# Patient Record
Sex: FEMALE | Race: BLACK OR AFRICAN AMERICAN | NOT HISPANIC OR LATINO | Employment: FULL TIME | URBAN - METROPOLITAN AREA
[De-identification: names, ages, dates, MRNs, and addresses within clinical notes are randomized per-mention and may not be internally consistent; named-entity substitution may affect disease eponyms.]

---

## 2019-04-25 ENCOUNTER — OFFICE VISIT (OUTPATIENT)
Dept: URGENT CARE | Facility: CLINIC | Age: 24
End: 2019-04-25
Payer: COMMERCIAL

## 2019-04-25 VITALS
SYSTOLIC BLOOD PRESSURE: 124 MMHG | BODY MASS INDEX: 27.31 KG/M2 | RESPIRATION RATE: 18 BRPM | OXYGEN SATURATION: 95 % | TEMPERATURE: 98 F | DIASTOLIC BLOOD PRESSURE: 92 MMHG | WEIGHT: 160 LBS | HEART RATE: 76 BPM | HEIGHT: 64 IN

## 2019-04-25 DIAGNOSIS — R30.0 DYSURIA: Primary | ICD-10-CM

## 2019-04-25 LAB
BILIRUB UR QL STRIP: NEGATIVE
GLUCOSE UR QL STRIP: NEGATIVE
KETONES UR QL STRIP: NEGATIVE
LEUKOCYTE ESTERASE UR QL STRIP: POSITIVE
PH, POC UA: 6
POC BLOOD, URINE: POSITIVE
POC NITRATES, URINE: NEGATIVE
PROT UR QL STRIP: POSITIVE
SP GR UR STRIP: 1.02 (ref 1–1.03)
UROBILINOGEN UR STRIP-ACNC: NORMAL (ref 0.1–1.1)

## 2019-04-25 PROCEDURE — 3008F PR BODY MASS INDEX (BMI) DOCUMENTED: ICD-10-PCS | Mod: CPTII,S$GLB,, | Performed by: FAMILY MEDICINE

## 2019-04-25 PROCEDURE — 99203 PR OFFICE/OUTPT VISIT, NEW, LEVL III, 30-44 MIN: ICD-10-PCS | Mod: 25,S$GLB,, | Performed by: FAMILY MEDICINE

## 2019-04-25 PROCEDURE — 3008F BODY MASS INDEX DOCD: CPT | Mod: CPTII,S$GLB,, | Performed by: FAMILY MEDICINE

## 2019-04-25 PROCEDURE — 81003 POCT URINALYSIS, DIPSTICK, AUTOMATED, W/O SCOPE: ICD-10-PCS | Mod: QW,S$GLB,, | Performed by: FAMILY MEDICINE

## 2019-04-25 PROCEDURE — 99203 OFFICE O/P NEW LOW 30 MIN: CPT | Mod: 25,S$GLB,, | Performed by: FAMILY MEDICINE

## 2019-04-25 PROCEDURE — 81003 URINALYSIS AUTO W/O SCOPE: CPT | Mod: QW,S$GLB,, | Performed by: FAMILY MEDICINE

## 2019-04-25 RX ORDER — FLUCONAZOLE 150 MG/1
150 TABLET ORAL DAILY
Qty: 3 TABLET | Refills: 1 | Status: SHIPPED | OUTPATIENT
Start: 2019-04-25 | End: 2019-04-26

## 2019-04-25 RX ORDER — PHENAZOPYRIDINE HYDROCHLORIDE 200 MG/1
200 TABLET, FILM COATED ORAL 3 TIMES DAILY PRN
Qty: 6 TABLET | Refills: 2 | Status: SHIPPED | OUTPATIENT
Start: 2019-04-25 | End: 2019-04-27

## 2019-04-25 RX ORDER — SULFAMETHOXAZOLE AND TRIMETHOPRIM 800; 160 MG/1; MG/1
1 TABLET ORAL 2 TIMES DAILY
Qty: 10 TABLET | Refills: 1 | Status: SHIPPED | OUTPATIENT
Start: 2019-04-25 | End: 2019-11-08 | Stop reason: SDUPTHER

## 2019-04-25 NOTE — PROGRESS NOTES
"Subjective:       Patient ID: Marianne Hester is a 23 y.o. female.    Vitals:  height is 5' 4" (1.626 m) and weight is 72.6 kg (160 lb). Her oral temperature is 98.1 °F (36.7 °C). Her blood pressure is 124/92 (abnormal) and her pulse is 76. Her respiration is 18 and oxygen saturation is 95%.     Chief Complaint: Urinary Tract Infection    Pt states when she goes to the bathroom doesn't feeling like burning or pain, it just discomfort and feels like "tingling" since Saturday. Pt states she is going more often to the bathroom but amount of urine is less. Pt states she had UTI once and the medicine given to treat it cause her yeast infection.     Urinary Tract Infection    This is a new problem. The current episode started in the past 7 days. The problem occurs every urination. The problem has been unchanged. The pain is at a severity of 6/10. The pain is moderate. There has been no fever. She is sexually active. There is a history of pyelonephritis. Associated symptoms include frequency. Pertinent negatives include no chills, hematuria, nausea, urgency, vomiting or rash. Treatments tried: azo. The treatment provided mild relief. Her past medical history is significant for recurrent UTIs and STD.       Constitution: Negative for chills and fever.   Neck: Negative for painful lymph nodes.   Gastrointestinal: Negative for abdominal pain, nausea and vomiting.   Genitourinary: Positive for frequency, urine decreased and irregular menstruation. Negative for dysuria, urgency, hematuria, history of kidney stones, painful menstruation, missed menses, heavy menstrual bleeding, ovarian cysts, genital trauma, vaginal pain, vaginal discharge, vaginal bleeding, vaginal odor, painful intercourse, genital sore, painful ejaculation and pelvic pain.   Musculoskeletal: Negative for back pain.   Skin: Negative for rash and lesion.   Hematologic/Lymphatic: Negative for swollen lymph nodes.       Objective:      Physical Exam "   Constitutional: She is oriented to person, place, and time. She appears well-developed and well-nourished.   HENT:   Head: Atraumatic.   Nose: Nose normal.   Eyes: Lids are normal.   Neck: Trachea normal, normal range of motion and phonation normal. Neck supple.   Cardiovascular: Normal rate and normal pulses.   Pulmonary/Chest: Effort normal.   Abdominal: Soft. Normal appearance and bowel sounds are normal. She exhibits no distension. There is tenderness in the suprapubic area. There is no CVA tenderness.   Neurological: She is alert and oriented to person, place, and time.   Skin: Skin is warm, dry and intact.   Psychiatric: She has a normal mood and affect. Her speech is normal and behavior is normal. Cognition and memory are normal.   Nursing note and vitals reviewed.      Assessment:       1. Dysuria        Plan:         Dysuria  -     POCT Urinalysis, Dipstick, Automated, W/O Scope  -     Urine culture    Other orders  -     sulfamethoxazole-trimethoprim 800-160mg (BACTRIM DS) 800-160 mg Tab; Take 1 tablet by mouth 2 (two) times daily.  Dispense: 10 tablet; Refill: 1  -     fluconazole (DIFLUCAN) 150 MG Tab; Take 1 tablet (150 mg total) by mouth once daily. for 1 day  Dispense: 3 tablet; Refill: 1  -     phenazopyridine (PYRIDIUM) 200 MG tablet; Take 1 tablet (200 mg total) by mouth 3 (three) times daily as needed for Pain.  Dispense: 6 tablet; Refill: 2

## 2019-04-30 ENCOUNTER — TELEPHONE (OUTPATIENT)
Dept: URGENT CARE | Facility: CLINIC | Age: 24
End: 2019-04-30

## 2019-04-30 LAB
BACTERIA UR CULT: ABNORMAL
BACTERIA UR CULT: ABNORMAL

## 2019-04-30 NOTE — TELEPHONE ENCOUNTER
Per Dr. Casey, patient's urine culture is +staph, and patient is to continue bactrim.   I contacted the patient to notify her of this.  I informed her that if she is not feeling better after completing the bactrim, she can follow up with her PCP or come back in to see us if necessary.  She verbalized understanding.

## 2019-12-23 ENCOUNTER — OFFICE VISIT (OUTPATIENT)
Dept: URGENT CARE | Facility: CLINIC | Age: 24
End: 2019-12-23
Payer: COMMERCIAL

## 2019-12-23 VITALS
TEMPERATURE: 98 F | SYSTOLIC BLOOD PRESSURE: 130 MMHG | BODY MASS INDEX: 30.22 KG/M2 | HEIGHT: 64 IN | WEIGHT: 177 LBS | OXYGEN SATURATION: 98 % | DIASTOLIC BLOOD PRESSURE: 93 MMHG | RESPIRATION RATE: 16 BRPM | HEART RATE: 86 BPM

## 2019-12-23 DIAGNOSIS — J02.9 SORE THROAT: ICD-10-CM

## 2019-12-23 DIAGNOSIS — R51.9 GENERALIZED HEADACHES: ICD-10-CM

## 2019-12-23 DIAGNOSIS — J06.9 VIRAL URI WITH COUGH: Primary | ICD-10-CM

## 2019-12-23 LAB
CTP QC/QA: YES
CTP QC/QA: YES
FLUAV AG NPH QL: NEGATIVE
FLUBV AG NPH QL: NEGATIVE
S PYO RRNA THROAT QL PROBE: NEGATIVE

## 2019-12-23 PROCEDURE — 87804 POCT INFLUENZA A/B: ICD-10-PCS | Mod: 59,QW,S$GLB, | Performed by: PHYSICIAN ASSISTANT

## 2019-12-23 PROCEDURE — 99214 OFFICE O/P EST MOD 30 MIN: CPT | Mod: S$GLB,,, | Performed by: PHYSICIAN ASSISTANT

## 2019-12-23 PROCEDURE — 87880 STREP A ASSAY W/OPTIC: CPT | Mod: QW,S$GLB,, | Performed by: PHYSICIAN ASSISTANT

## 2019-12-23 PROCEDURE — 87880 POCT RAPID STREP A: ICD-10-PCS | Mod: QW,S$GLB,, | Performed by: PHYSICIAN ASSISTANT

## 2019-12-23 PROCEDURE — 99214 PR OFFICE/OUTPT VISIT, EST, LEVL IV, 30-39 MIN: ICD-10-PCS | Mod: S$GLB,,, | Performed by: PHYSICIAN ASSISTANT

## 2019-12-23 PROCEDURE — 87804 INFLUENZA ASSAY W/OPTIC: CPT | Mod: QW,S$GLB,, | Performed by: PHYSICIAN ASSISTANT

## 2019-12-23 RX ORDER — BENZONATATE 100 MG/1
100 CAPSULE ORAL EVERY 6 HOURS PRN
Qty: 60 CAPSULE | Refills: 1 | Status: SHIPPED | OUTPATIENT
Start: 2019-12-23 | End: 2020-12-22

## 2019-12-23 RX ORDER — PREDNISONE 10 MG/1
TABLET ORAL
Qty: 20 TABLET | Refills: 0 | Status: SHIPPED | OUTPATIENT
Start: 2019-12-23 | End: 2021-02-03 | Stop reason: ALTCHOICE

## 2019-12-24 NOTE — PROGRESS NOTES
"Subjective:       Patient ID: Marianne Hester is a 23 y.o. female.    Vitals:  height is 5' 4" (1.626 m) and weight is 80.3 kg (177 lb). Her oral temperature is 98.3 °F (36.8 °C). Her blood pressure is 130/93 (abnormal) and her pulse is 86. Her respiration is 16 and oxygen saturation is 98%.     Chief Complaint: Sinus Problem    Patient has been having a runny nose, scratchy throat, headaches, light headed, and having hot flashes as well as a cough since yesterday. Patient has taken astelin and flonase.    Sinus Problem   This is a new problem. The current episode started yesterday. The problem has been gradually worsening since onset. There has been no fever. Her pain is at a severity of 0/10. She is experiencing no pain. Associated symptoms include coughing, diaphoresis, headaches, sneezing and a sore throat (scratchy). Pertinent negatives include no chills, congestion, ear pain, shortness of breath or sinus pressure. Past treatments include saline sprays. The treatment provided mild relief.       Constitution: Positive for sweating. Negative for chills, fatigue and fever.   HENT: Positive for sore throat (scratchy). Negative for ear pain, congestion, sinus pain, sinus pressure and voice change.    Neck: Negative for painful lymph nodes.   Eyes: Negative for eye redness.   Respiratory: Positive for cough. Negative for chest tightness, sputum production, bloody sputum, COPD, shortness of breath, stridor, wheezing and asthma.    Gastrointestinal: Negative for nausea and vomiting.   Musculoskeletal: Negative for muscle ache.   Skin: Negative for rash.   Allergic/Immunologic: Positive for sneezing. Negative for seasonal allergies and asthma.   Neurological: Positive for headaches.   Hematologic/Lymphatic: Negative for swollen lymph nodes.       Objective:      Physical Exam   Constitutional: She is oriented to person, place, and time. She appears well-developed and well-nourished. She is cooperative.  Non-toxic " appearance. She does not have a sickly appearance. She does not appear ill. No distress.   HENT:   Head: Normocephalic and atraumatic.   Right Ear: Hearing, external ear and ear canal normal. A middle ear effusion (clear) is present.   Left Ear: Hearing, external ear and ear canal normal. A middle ear effusion (clear) is present.   Nose: Rhinorrhea present. No mucosal edema or nasal deformity. No epistaxis. Right sinus exhibits no maxillary sinus tenderness and no frontal sinus tenderness. Left sinus exhibits no maxillary sinus tenderness and no frontal sinus tenderness.   Mouth/Throat: Uvula is midline and mucous membranes are normal. No trismus in the jaw. Normal dentition. No uvula swelling. No oropharyngeal exudate or posterior oropharyngeal edema.   Eyes: Conjunctivae and lids are normal. No scleral icterus.   Neck: Trachea normal, full passive range of motion without pain and phonation normal. Neck supple. No neck rigidity. No edema and no erythema present.   Cardiovascular: Normal rate, regular rhythm, normal heart sounds, intact distal pulses and normal pulses.   Pulmonary/Chest: Effort normal and breath sounds normal. No respiratory distress. She has no decreased breath sounds. She has no rhonchi.   Abdominal: Normal appearance.   Musculoskeletal: Normal range of motion. She exhibits no edema or deformity.   Neurological: She is alert and oriented to person, place, and time. She exhibits normal muscle tone. Coordination normal.   Skin: Skin is warm, dry, intact, not diaphoretic and not pale.   Psychiatric: She has a normal mood and affect. Her speech is normal and behavior is normal. Judgment and thought content normal. Cognition and memory are normal.   Nursing note and vitals reviewed.        Assessment:       1. Viral URI with cough    2. Generalized headaches    3. Sore throat        Plan:         Viral URI with cough    Generalized headaches  -     POCT Influenza A/B (negative)    Sore throat  -      POCT rapid strep A (negative)    Other orders  -     predniSONE (DELTASONE) 10 MG tablet; Take 40mg for 2days, take 30mg for 2 days, take 20mg for 2 days, take 10mg for 2 days  Dispense: 20 tablet; Refill: 0  -     benzonatate (TESSALON PERLES) 100 MG capsule; Take 1 capsule (100 mg total) by mouth every 6 (six) hours as needed.  Dispense: 60 capsule; Refill: 1    Discussed risks vs benefits steroids and patient would like to proceed.  Discussed symptomatic treatment and warning signs as listed below    Symptomatic treatment:    Alternate Tylenol and Ibuprofen every 3 hrs  salt water gargles to soothe throat  Honey/lemon water to soothe throat  Cold-eeze helps to reduce the duration of URI symptoms  Elderberry to reduce duration of URI symptoms  Cepachol helps to numb the discomfort in throat  Nasal saline spray reduces inflammation and dryness  Warm face compresses/hot showers as often as you can to open sinuses   Vicks vapor rub at night  Flonase OTC or Nasacort OTC  Simple foods like chicken noodle soup help hydrate  Delsym helps with coughing at night  Zyrtec/Claritin during the day and Benadryl at night may help if allergy component   Zantac will help if there is reflux from the post nasal drip  Rest as much as you can  Your symptoms will likely last 7-10 days, maybe longer depending on how it affects your body.  You are contagious 7-10, so minimize contact with others to reduce the spread to others and stay home from work or school as we discussed. Dehydration is preventable but is one of the main reasons why you will feel so badly. Drink pedialyte, gatorade or propel. Stay hydrated.  Antibiotics are not needed unless a complication(such as Otitis Media, Bacterial sinus infection or pneumonia). Taking antibiotics for Flu/Cold is not supported by evidencebased medicine and can expose you to unnecessary side effects of the medication, such as anaphylaxis.   If you experience any:  Chest pain, shortness of  breath, wheezing or difficulty breathing  Severe headache, face, neck or ear pain  New rash  Fever over 101.5º F (38.6 C) for more than three days  Confusion, behavior change or seizure  Severe weakness or dizziness  Go to ER

## 2024-03-28 ENCOUNTER — PROCEDURE VISIT (OUTPATIENT)
Dept: MATERNAL FETAL MEDICINE | Facility: CLINIC | Age: 29
End: 2024-03-28
Payer: MEDICAID

## 2024-03-28 ENCOUNTER — OFFICE VISIT (OUTPATIENT)
Dept: MATERNAL FETAL MEDICINE | Facility: CLINIC | Age: 29
End: 2024-03-28
Payer: MEDICAID

## 2024-03-28 VITALS
DIASTOLIC BLOOD PRESSURE: 82 MMHG | BODY MASS INDEX: 35.19 KG/M2 | SYSTOLIC BLOOD PRESSURE: 136 MMHG | HEART RATE: 81 BPM | WEIGHT: 205 LBS

## 2024-03-28 DIAGNOSIS — O10.919 CHRONIC HYPERTENSION IN PREGNANCY: Primary | ICD-10-CM

## 2024-03-28 DIAGNOSIS — Z36.89 ENCOUNTER FOR FETAL ANATOMIC SURVEY: ICD-10-CM

## 2024-03-28 DIAGNOSIS — O34.12 UTERINE FIBROIDS AFFECTING PREGNANCY IN SECOND TRIMESTER: ICD-10-CM

## 2024-03-28 DIAGNOSIS — D25.9 UTERINE FIBROIDS AFFECTING PREGNANCY IN SECOND TRIMESTER: ICD-10-CM

## 2024-03-28 DIAGNOSIS — O34.12 MATERNAL CARE FOR BENIGN TUMOR OF CORPUS UTERI IN SECOND TRIMESTER: ICD-10-CM

## 2024-03-28 PROCEDURE — 1159F MED LIST DOCD IN RCRD: CPT | Mod: CPTII,,, | Performed by: STUDENT IN AN ORGANIZED HEALTH CARE EDUCATION/TRAINING PROGRAM

## 2024-03-28 PROCEDURE — 3008F BODY MASS INDEX DOCD: CPT | Mod: CPTII,,, | Performed by: STUDENT IN AN ORGANIZED HEALTH CARE EDUCATION/TRAINING PROGRAM

## 2024-03-28 PROCEDURE — 99212 OFFICE O/P EST SF 10 MIN: CPT | Mod: PBBFAC,TH,PN,25 | Performed by: STUDENT IN AN ORGANIZED HEALTH CARE EDUCATION/TRAINING PROGRAM

## 2024-03-28 PROCEDURE — 99204 OFFICE O/P NEW MOD 45 MIN: CPT | Mod: S$PBB,TH,, | Performed by: STUDENT IN AN ORGANIZED HEALTH CARE EDUCATION/TRAINING PROGRAM

## 2024-03-28 PROCEDURE — 76811 OB US DETAILED SNGL FETUS: CPT | Mod: PBBFAC,PN | Performed by: STUDENT IN AN ORGANIZED HEALTH CARE EDUCATION/TRAINING PROGRAM

## 2024-03-28 PROCEDURE — 3075F SYST BP GE 130 - 139MM HG: CPT | Mod: CPTII,,, | Performed by: STUDENT IN AN ORGANIZED HEALTH CARE EDUCATION/TRAINING PROGRAM

## 2024-03-28 PROCEDURE — 99999 PR PBB SHADOW E&M-EST. PATIENT-LVL II: CPT | Mod: PBBFAC,,, | Performed by: STUDENT IN AN ORGANIZED HEALTH CARE EDUCATION/TRAINING PROGRAM

## 2024-03-28 PROCEDURE — 3079F DIAST BP 80-89 MM HG: CPT | Mod: CPTII,,, | Performed by: STUDENT IN AN ORGANIZED HEALTH CARE EDUCATION/TRAINING PROGRAM

## 2024-03-28 PROCEDURE — 1160F RVW MEDS BY RX/DR IN RCRD: CPT | Mod: CPTII,,, | Performed by: STUDENT IN AN ORGANIZED HEALTH CARE EDUCATION/TRAINING PROGRAM

## 2024-03-28 RX ORDER — ASPIRIN 81 MG/1
81 TABLET ORAL DAILY
COMMUNITY

## 2024-03-28 NOTE — PROGRESS NOTES
MATERNAL-FETAL MEDICINE   CONSULT NOTE    Provider requesting consultation: Adina  SUBJECTIVE:   Ms. Marianne Givens is a 28 y.o.  female with IUP at 21w6d who is seen in consultation by MFM for evaluation and management of:  Problem   Chronic Hypertension in Pregnancy   Uterine Fibroids Affecting Pregnancy in Second Trimester        Medication List with Changes/Refills   Current Medications    ASPIRIN (ECOTRIN) 81 MG EC TABLET    Take 81 mg by mouth once daily.    PRENATAL VIT/IRON FUM/FOLIC AC (PRENATAL 1+1 ORAL)    Take by mouth.     Review of patient's allergies indicates:  No Known Allergies  OB History    Para Term  AB Living   3 1 1   1 1   SAB IAB Ectopic Multiple Live Births     1     1      # Outcome Date GA Lbr Cesar/2nd Weight Sex Delivery Anes PTL Lv   3 Current            2 Term    3.714 kg (8 lb 3 oz) M Vag-Spont  N JERE      Complications: Leiomyoma of uterus   1 IAB              Past Medical History:   Diagnosis Date    Leiomyoma of uterus      No past surgical history on file.  Family history: negative for birth defects, recurrent miscarriages, chromosomal abnormalities.   Social History     Tobacco Use    Smoking status: Never    Smokeless tobacco: Never   Substance Use Topics    Alcohol use: No    Drug use: Never     Review of patient's allergies indicates:  No Known Allergies  Objective:   /82   Pulse 81   Wt 93 kg (205 lb 0.4 oz)   BMI 35.19 kg/m²   Ultrasound performed. See viewpoint for full ultrasound report.  A detailed fetal anatomic ultrasound examination was performed for the following high risk indication: fibroid uterus .   No fetal structural malformations are identified; however, fetal imaging is incomplete today.   A follow-up study will be scheduled to complete the fetal anatomic survey.   Fetal size today is consistent with established gestational age.   Cervical length by TA scanning is normal.   Placental location is anterior without evidence of previa.    Multiple uterine fibroids visualized. Largest approximately 7.2 x 4.3 x 5.7 cm in the anterior uterine wall   Right ovarian cyst probable dermoid cyst similar in size to prior ultrasound. No blood flow to the cyst.     ASSESSMENT/PLAN:     28 y.o.  female with IUP at 21w6d     Chronic hypertension in pregnancy  Chronic Hypertension  Today I counseled the patient on maternal/fetal risks associated with CHTN during pregnancy. Risks include but not limited to fetal growth restriction, miscarriage, abruption, maternal end organ disease (renal failure, MI, and stroke),  delivery, development of superimposed preeclampsia, and eclampsia. She was counseled on the recommendations for blood pressure control, serial ultrasound for fetal growth assessment and  testing, and timing of delivery. I also counseled her on the recommendation for aspirin 81 mg daily which may decrease her risk of developing superimposed preeclampsia.     Recommendations (Please refer to [facility] guidelines):  Continue aspirin 81 mg daily for preeclampsia risk reduction  Currently on no medications. If needed, recommend starting Procardia 30 XL  Baseline evaluation with primary OB:   24-hour urine protein or baseline P/C ratio, CMP, and CBC.  Maternal EKG  Maternal ophthalmic evaluation  Maternal echocardiogram if HTN has been long-standing or EKG is abnormal  Serial fetal growth ultrasounds every 4-6 weeks, beginning at 26-28 weeks.   Continued close observation of patient's blood pressures. Avoid hypotension as this has been associated with uteroplacental insufficiency.  Recommend treatment to a goal blood pressure < 140/90  Weekly antepartum testing at 32 weeks (NST+AFV); twice weekly testing if control is poor, multiple comorbidities are present, or requires several medications for control     Delivery timing:  No medications, no comorbid conditions: 39 0/7 - 39 6/7 weeks gestation  No medications, comorbid conditions: 38  0/7 - 38 6/7 weeks gestation  Controlled on single agent, no comorbid conditions*: 38 0/7 - 38 6/7 weeks gestation  Controlled on single agent, comorbid conditions*: 37 0/7 - 38 6/7 weeks gestation  Uncontrolled or requiring ? 2 medications: 36 0/7 - 37 6/7 weeks gestation    *Comorbid conditions include BMI >= 40, diabetes, and complex medical condition associated with placental dysfunction (ie lupus or other vascular disease)  Delivery may be recommended earlier pending results of fetal growth ultrasounds, AFV assessment, or antepartum testing results.        Uterine fibroids affecting pregnancy in second trimester  Fibroids  I counseled the patient regarding fibroids in pregnancy. She was counseled on the risk for  delivery,  PROM, fibroid degeneration, poor fetal growth, oligohydramnios, vaginal bleeding, and in rare circumstances obstruction of lower uterus/cervix, impeding vaginal delivery.  Myomectomy is generally avoided in pregnancy due to concerns regarding pregnancy complications.    Recommendations:  Fetal growth ultrasound at 32 weeks for patients with a single large fibroid or multiple fibroids  More frequent ultrasounds can be performed per clinical concern  If concern for degenerating fibroids, can administer a short course of NSAIDs (approximately 48-72 hours of Indocin or ibuprofen) prior to 32 weeks gestation  Document postpartum hemorrhage risk on admission to hospital, ensure T&S sent, and consider type and crossmatch depending on postpartum hemorrhage risk      FOLLOW UP:     F/u in 4 weeks for US/MFM visit    Malia Acosta  Maternal-Fetal Medicine    Electronically Signed by Malia Acosta 2024

## 2024-03-29 NOTE — ASSESSMENT & PLAN NOTE
Fibroids  I counseled the patient regarding fibroids in pregnancy. She was counseled on the risk for  delivery,  PROM, fibroid degeneration, poor fetal growth, oligohydramnios, vaginal bleeding, and in rare circumstances obstruction of lower uterus/cervix, impeding vaginal delivery.  Myomectomy is generally avoided in pregnancy due to concerns regarding pregnancy complications.    Recommendations:  Fetal growth ultrasound at 32 weeks for patients with a single large fibroid or multiple fibroids  More frequent ultrasounds can be performed per clinical concern  If concern for degenerating fibroids, can administer a short course of NSAIDs (approximately 48-72 hours of Indocin or ibuprofen) prior to 32 weeks gestation  Document postpartum hemorrhage risk on admission to hospital, ensure T&S sent, and consider type and crossmatch depending on postpartum hemorrhage risk

## 2024-03-29 NOTE — ASSESSMENT & PLAN NOTE
Chronic Hypertension  Today I counseled the patient on maternal/fetal risks associated with CHTN during pregnancy. Risks include but not limited to fetal growth restriction, miscarriage, abruption, maternal end organ disease (renal failure, MI, and stroke),  delivery, development of superimposed preeclampsia, and eclampsia. She was counseled on the recommendations for blood pressure control, serial ultrasound for fetal growth assessment and  testing, and timing of delivery. I also counseled her on the recommendation for aspirin 81 mg daily which may decrease her risk of developing superimposed preeclampsia.     Recommendations (Please refer to [facility] guidelines):  Continue aspirin 81 mg daily for preeclampsia risk reduction  Currently on no medications. If needed, recommend starting Procardia 30 XL  Baseline evaluation with primary OB:   24-hour urine protein or baseline P/C ratio, CMP, and CBC.  Maternal EKG  Maternal ophthalmic evaluation  Maternal echocardiogram if HTN has been long-standing or EKG is abnormal  Serial fetal growth ultrasounds every 4-6 weeks, beginning at 26-28 weeks.   Continued close observation of patient's blood pressures. Avoid hypotension as this has been associated with uteroplacental insufficiency.  Recommend treatment to a goal blood pressure < 140/90  Weekly antepartum testing at 32 weeks (NST+AFV); twice weekly testing if control is poor, multiple comorbidities are present, or requires several medications for control     Delivery timing:  No medications, no comorbid conditions: 39 0/7 - 39 6/7 weeks gestation  No medications, comorbid conditions: 38 0/7 - 38 6/7 weeks gestation  Controlled on single agent, no comorbid conditions*: 38 0/7 - 38 6/7 weeks gestation  Controlled on single agent, comorbid conditions*: 37 0/7 - 38 6/7 weeks gestation  Uncontrolled or requiring ? 2 medications: 36 0/7 - 37 6/7 weeks gestation    *Comorbid conditions include BMI >= 40,  diabetes, and complex medical condition associated with placental dysfunction (ie lupus or other vascular disease)  Delivery may be recommended earlier pending results of fetal growth ultrasounds, AFV assessment, or antepartum testing results.

## 2024-04-30 ENCOUNTER — PROCEDURE VISIT (OUTPATIENT)
Dept: MATERNAL FETAL MEDICINE | Facility: CLINIC | Age: 29
End: 2024-04-30
Payer: MEDICAID

## 2024-04-30 ENCOUNTER — OFFICE VISIT (OUTPATIENT)
Dept: MATERNAL FETAL MEDICINE | Facility: CLINIC | Age: 29
End: 2024-04-30
Payer: MEDICAID

## 2024-04-30 VITALS
HEIGHT: 64 IN | SYSTOLIC BLOOD PRESSURE: 123 MMHG | BODY MASS INDEX: 36.51 KG/M2 | HEART RATE: 87 BPM | WEIGHT: 213.88 LBS | DIASTOLIC BLOOD PRESSURE: 74 MMHG

## 2024-04-30 DIAGNOSIS — O10.919 CHRONIC HYPERTENSION IN PREGNANCY: Primary | ICD-10-CM

## 2024-04-30 DIAGNOSIS — D25.9 UTERINE FIBROIDS AFFECTING PREGNANCY IN SECOND TRIMESTER: ICD-10-CM

## 2024-04-30 DIAGNOSIS — O34.12 UTERINE FIBROIDS AFFECTING PREGNANCY IN SECOND TRIMESTER: ICD-10-CM

## 2024-04-30 DIAGNOSIS — O34.12 MATERNAL CARE FOR BENIGN TUMOR OF CORPUS UTERI IN SECOND TRIMESTER: ICD-10-CM

## 2024-04-30 DIAGNOSIS — Z36.2 ENCOUNTER FOR FOLLOW-UP ULTRASOUND OF FETAL ANATOMY: ICD-10-CM

## 2024-04-30 PROCEDURE — 99214 OFFICE O/P EST MOD 30 MIN: CPT | Mod: S$PBB,TH,, | Performed by: STUDENT IN AN ORGANIZED HEALTH CARE EDUCATION/TRAINING PROGRAM

## 2024-04-30 PROCEDURE — 3074F SYST BP LT 130 MM HG: CPT | Mod: CPTII,,, | Performed by: STUDENT IN AN ORGANIZED HEALTH CARE EDUCATION/TRAINING PROGRAM

## 2024-04-30 PROCEDURE — 1159F MED LIST DOCD IN RCRD: CPT | Mod: CPTII,,, | Performed by: STUDENT IN AN ORGANIZED HEALTH CARE EDUCATION/TRAINING PROGRAM

## 2024-04-30 PROCEDURE — 76816 OB US FOLLOW-UP PER FETUS: CPT | Mod: PBBFAC,PN | Performed by: STUDENT IN AN ORGANIZED HEALTH CARE EDUCATION/TRAINING PROGRAM

## 2024-04-30 PROCEDURE — 99999 PR PBB SHADOW E&M-EST. PATIENT-LVL II: CPT | Mod: PBBFAC,,, | Performed by: STUDENT IN AN ORGANIZED HEALTH CARE EDUCATION/TRAINING PROGRAM

## 2024-04-30 PROCEDURE — 3008F BODY MASS INDEX DOCD: CPT | Mod: CPTII,,, | Performed by: STUDENT IN AN ORGANIZED HEALTH CARE EDUCATION/TRAINING PROGRAM

## 2024-04-30 PROCEDURE — 99212 OFFICE O/P EST SF 10 MIN: CPT | Mod: PBBFAC,TH,PN,25 | Performed by: STUDENT IN AN ORGANIZED HEALTH CARE EDUCATION/TRAINING PROGRAM

## 2024-04-30 PROCEDURE — 3078F DIAST BP <80 MM HG: CPT | Mod: CPTII,,, | Performed by: STUDENT IN AN ORGANIZED HEALTH CARE EDUCATION/TRAINING PROGRAM

## 2024-04-30 NOTE — PROGRESS NOTES
"Maternal Fetal Medicine Follow up  SUBJECTIVE:     Marianne Givens is a 28 y.o.  female with IUP at 26w4d who is seen for [facility] follow up for management of:    Problem   Chronic Hypertension in Pregnancy   Uterine Fibroids Affecting Pregnancy in Second Trimester     Previous notes reviewed.   No changes to medical, surgical, family, social, or obstetric history.  Current Outpatient Medications   Medication Instructions    aspirin (ECOTRIN) 81 mg, Oral, Daily    prenatal vit/iron fum/folic ac (PRENATAL 1+1 ORAL) Oral     Review of patient's allergies indicates:  No Known Allergies  Care team members:  MD Adina - Primary OB  OBJECTIVE:   Blood Pressure: /74   Pulse 87   Ht 5' 4" (1.626 m)   Wt 97 kg (213 lb 13.5 oz)   BMI 36.71 kg/m²   Ultrasound performed. See viewpoint for full ultrasound report.  Fetal size is AGA with the EFW at the 55% and the AC at the 65%. The EFW is 1030 g.  A limited repeat fetal anatomic survey shows no abnormalities of the structures that were adequately imaged.  AFV is normal.   Fibroids as above  Right ovarian cyst stable in size  ASSESSMENT/PLAN:     28 y.o.  female with IUP at 26w4d presents for [facility] follow up.    Chronic hypertension in pregnancy  Chronic Hypertension  Previously counseled. Full recommendations in prior note    Recommendations (Please refer to [facility] guidelines):  Continue aspirin 81 mg daily for preeclampsia risk reduction  Currently on no medications. If needed, recommend starting Procardia 30 XL  Serial fetal growth ultrasounds every 4-6 weeks, beginning at 26-28 weeks. Scheduled  Continued close observation of patient's blood pressures. Avoid hypotension as this has been associated with uteroplacental insufficiency.  Recommend treatment to a goal blood pressure < 140/90  Weekly antepartum testing at 32 weeks (NST+AFV); twice weekly testing if control is poor, multiple comorbidities are present, or requires several medications for control "           Uterine fibroids affecting pregnancy in second trimester  Previously counseled    FOLLOW UP:   F/u in 6 weeks for US/MFM visit    Malia Acosta  Maternal-Fetal Medicine    Electronically Signed by Malia Acosta April 30, 2024

## 2024-04-30 NOTE — ASSESSMENT & PLAN NOTE
Chronic Hypertension  Previously counseled. Full recommendations in prior note    Recommendations (Please refer to [facility] guidelines):  Continue aspirin 81 mg daily for preeclampsia risk reduction  Currently on no medications. If needed, recommend starting Procardia 30 XL  Serial fetal growth ultrasounds every 4-6 weeks, beginning at 26-28 weeks. Scheduled  Continued close observation of patient's blood pressures. Avoid hypotension as this has been associated with uteroplacental insufficiency.  Recommend treatment to a goal blood pressure < 140/90  Weekly antepartum testing at 32 weeks (NST+AFV); twice weekly testing if control is poor, multiple comorbidities are present, or requires several medications for control

## 2024-06-11 ENCOUNTER — PROCEDURE VISIT (OUTPATIENT)
Dept: MATERNAL FETAL MEDICINE | Facility: CLINIC | Age: 29
End: 2024-06-11
Payer: MEDICAID

## 2024-06-11 ENCOUNTER — OFFICE VISIT (OUTPATIENT)
Dept: MATERNAL FETAL MEDICINE | Facility: CLINIC | Age: 29
End: 2024-06-11
Payer: MEDICAID

## 2024-06-11 VITALS
WEIGHT: 222.44 LBS | DIASTOLIC BLOOD PRESSURE: 67 MMHG | BODY MASS INDEX: 37.98 KG/M2 | HEIGHT: 64 IN | HEART RATE: 99 BPM | SYSTOLIC BLOOD PRESSURE: 113 MMHG

## 2024-06-11 DIAGNOSIS — Z36.89 ENCOUNTER FOR ULTRASOUND TO ASSESS FETAL GROWTH: ICD-10-CM

## 2024-06-11 DIAGNOSIS — D25.9 UTERINE FIBROIDS AFFECTING PREGNANCY IN SECOND TRIMESTER: ICD-10-CM

## 2024-06-11 DIAGNOSIS — O10.919 CHRONIC HYPERTENSION IN PREGNANCY: Primary | ICD-10-CM

## 2024-06-11 DIAGNOSIS — D36.9 DERMOID CYST: ICD-10-CM

## 2024-06-11 DIAGNOSIS — O34.12 UTERINE FIBROIDS AFFECTING PREGNANCY IN SECOND TRIMESTER: ICD-10-CM

## 2024-06-11 PROCEDURE — 76816 OB US FOLLOW-UP PER FETUS: CPT | Mod: PBBFAC,PN | Performed by: OBSTETRICS & GYNECOLOGY

## 2024-06-11 PROCEDURE — 76819 FETAL BIOPHYS PROFIL W/O NST: CPT | Mod: 26,S$PBB,, | Performed by: OBSTETRICS & GYNECOLOGY

## 2024-06-11 PROCEDURE — 99213 OFFICE O/P EST LOW 20 MIN: CPT | Mod: TH,25,S$PBB, | Performed by: OBSTETRICS & GYNECOLOGY

## 2024-06-11 NOTE — PROGRESS NOTES
"Pt states OB was concerned with her weight gain. Pt has decreased her "sweets" intake x 1 month.   Pt states she believes last weight entered with MFM is an error. May have stepped on scale before it "zeroed" out which can cause an error.    "

## 2024-06-11 NOTE — ASSESSMENT & PLAN NOTE
Noted on anatomy survey. Unchanged today.  Recommend follow up imaging 3-4 months after delivery. Management per primary OB/GYN.

## 2024-06-11 NOTE — ASSESSMENT & PLAN NOTE
Chronic Hypertension  Previously counseled. Full recommendations in prior note    Recommendations (Please refer to [facility] guidelines):  Continue aspirin 81 mg daily for preeclampsia risk reduction  Currently on no medications.   Serial fetal growth ultrasounds every 4-6 weeks, beginning at 26-28 weeks. Scheduled  Continued close observation of patient's blood pressures. Avoid hypotension as this has been associated with uteroplacental insufficiency.  Recommend treatment to a goal blood pressure < 140/90  Weekly antepartum testing at 32 weeks (NST+AFV); twice weekly testing if control is poor, multiple comorbidities are present, or requires several medications for control

## 2024-06-11 NOTE — PROGRESS NOTES
Maternal Fetal Medicine follow up consult      SUBJECTIVE:     Marianne Givens is a 28 y.o.  female with IUP at 32w4d  who is seen in follow up consultation by [facility].  Pregnancy complications include:   Problem   Dermoid Cyst   Chronic Hypertension in Pregnancy   Uterine Fibroids Affecting Pregnancy in Second Trimester       Previous notes reviewed.   No changes to medical, surgical, family, social, or obstetric history.    Interval history since last M visit: Reports UTI diagnosed in ED over the weekend. Denies fevers. The patient reports adequate fetal movement. She denies leakage of fluid, vaginal bleeding, contractions.  The patient denies symptoms of pre-eclampsia including headache, blurred vision, right upper quadrant pain, chest pain, and shortness of breath.     Medications:  PNV  ASA  Keflex     OBJECTIVE:     Blood Pressure: 113/67    Ultrasound performed. See viewpoint for full ultrasound report.  Fetal size is AGA with the EFW plotting at the 35% and the AC plotting at the 85%.   The EFW is 2127 g.  A limited repeat fetal anatomic survey shows no abnormalities of the structures that were adequately imaged.    The BPP score is reassuring at 8/8, and the AFV is normal.   Fibroids noted as measured above.  Right ovary again shows a large cyst as measured above, likely a dermoid.     ASSESSMENT/PLAN:     28 y.o.  female with IUP at 32w4d     Problems addressed at today's visit:  Chronic hypertension in pregnancy  Chronic Hypertension  Previously counseled. Full recommendations in prior note    Recommendations (Please refer to [facility] guidelines):  Continue aspirin 81 mg daily for preeclampsia risk reduction  Currently on no medications.   Serial fetal growth ultrasounds every 4-6 weeks, beginning at 26-28 weeks. Scheduled  Continued close observation of patient's blood pressures. Avoid hypotension as this has been associated with uteroplacental insufficiency.  Recommend treatment to a goal  blood pressure < 140/90  Weekly antepartum testing at 32 weeks (NST+AFV); twice weekly testing if control is poor, multiple comorbidities are present, or requires several medications for control     Uterine fibroids affecting pregnancy in second trimester  Previously counseled    Dermoid cyst  Noted on anatomy survey. Unchanged today.  Recommend follow up imaging 3-4 months after delivery. Management per primary OB/GYN.       Please see original MFM consultation for full details regarding management recommendations of these and other obstetric co-morbidities    FOLLOW UP:   Growth ultrasound scheduled in 4 weeks    Today I have spent 20 minutes in the care of the patient. This includes face to face time and non-face to face time preparing to see the patient (eg, review of tests), obtaining and/or reviewing separately obtained history, documenting clinical information in the electronic or other health record, independently interpreting results and communicating results to the patient/family/caregiver, or care coordination.     Birdie Vines MD  Maternal Fetal Medicine

## 2024-07-09 ENCOUNTER — TELEPHONE (OUTPATIENT)
Dept: MATERNAL FETAL MEDICINE | Facility: CLINIC | Age: 29
End: 2024-07-09
Payer: MEDICAID

## 2024-07-09 NOTE — TELEPHONE ENCOUNTER
Called patient bc she cancelled her appointtment with us yesterday for growth u/s and BPP.    Told her I was following up bc we had spoken with Dr Holden's office and we were to be be doing her  testing this week.    Pateint said she spoke with Dr Holden's office late afternoon and was under impression she would be receiving her care at this point thru her midwife.  She will be going in to see Dr Holden today and to have  testing.    Informed patient that we would be happy to do her growth u/s and the reasons for doing so.  Patient states she will discuss with Dr Holden and her midwife and call back if she needs to reschedule it.    All questions answered.    Will message Noreen with Dr Holden's office regarding above.

## 2024-07-16 ENCOUNTER — TELEPHONE (OUTPATIENT)
Dept: MATERNAL FETAL MEDICINE | Facility: CLINIC | Age: 29
End: 2024-07-16
Payer: MEDICAID

## 2024-07-16 NOTE — TELEPHONE ENCOUNTER
Left message for patient to reschedule her cancelled appt from 7/8/2024 for growth and BPP.    Spoke with Noreen at Dr Holden's off and they agree that we are doing those scans.    Explained in voice mail to patient and left direct number for her to call us back to reschedule or just to let me know where/if she is having her growth scan and BPP's elsewhere.    Will message Noreen and copy her on the message I left.

## 2024-07-20 ENCOUNTER — OFFICE VISIT (OUTPATIENT)
Dept: URGENT CARE | Facility: CLINIC | Age: 29
End: 2024-07-20
Payer: MEDICAID

## 2024-07-20 VITALS
DIASTOLIC BLOOD PRESSURE: 79 MMHG | TEMPERATURE: 98 F | RESPIRATION RATE: 18 BRPM | HEART RATE: 90 BPM | SYSTOLIC BLOOD PRESSURE: 121 MMHG | OXYGEN SATURATION: 98 % | HEIGHT: 64 IN | WEIGHT: 252 LBS | BODY MASS INDEX: 43.02 KG/M2

## 2024-07-20 DIAGNOSIS — J06.9 UPPER RESPIRATORY TRACT INFECTION, UNSPECIFIED TYPE: Primary | ICD-10-CM

## 2024-07-20 DIAGNOSIS — J02.9 SORE THROAT: ICD-10-CM

## 2024-07-20 LAB
CTP QC/QA: YES
MOLECULAR STREP A: NEGATIVE

## 2024-07-20 PROCEDURE — 87651 STREP A DNA AMP PROBE: CPT | Mod: QW,S$GLB,, | Performed by: PHYSICIAN ASSISTANT

## 2024-07-20 PROCEDURE — 99203 OFFICE O/P NEW LOW 30 MIN: CPT | Mod: S$GLB,,, | Performed by: PHYSICIAN ASSISTANT

## 2024-07-20 NOTE — PATIENT INSTRUCTIONS

## 2024-07-20 NOTE — PROGRESS NOTES
"Subjective:      Patient ID: Marianne Givens is a 28 y.o. female.    Vitals:  height is 5' 4" (1.626 m) and weight is 114.3 kg (252 lb). Her oral temperature is 98.2 °F (36.8 °C). Her blood pressure is 121/79 and her pulse is 90. Her respiration is 18 and oxygen saturation is 98%.     Chief Complaint: Cough    Pt present to  for a acute onset of a productive cough and runny nose, pt states sx started last week with productive light greenish sputum 4/10 pain level     Cough  This is a new problem. The current episode started in the past 7 days. The problem has been gradually worsening. The cough is Productive of sputum. Associated symptoms include postnasal drip and a sore throat. Pertinent negatives include no chest pain, chills, ear pain, eye redness, fever, headaches, heartburn, hemoptysis, myalgias, rash, shortness of breath or wheezing. Nothing aggravates the symptoms. She has tried OTC cough suppressant for the symptoms. The treatment provided no relief.       Constitution: Negative for chills, sweating, fatigue and fever.   HENT:  Positive for congestion, postnasal drip, sinus pressure and sore throat. Negative for ear pain, drooling, trouble swallowing and voice change.    Neck: Negative for neck pain, neck stiffness, painful lymph nodes and neck swelling.   Cardiovascular:  Negative for chest pain, leg swelling, palpitations, sob on exertion and passing out.   Eyes:  Negative for eye pain, eye redness, photophobia, double vision, blurred vision and eyelid swelling.   Respiratory:  Positive for cough. Negative for chest tightness, sputum production, bloody sputum, shortness of breath, stridor and wheezing.    Gastrointestinal:  Negative for abdominal pain, abdominal bloating, nausea, vomiting, constipation, diarrhea and heartburn.   Musculoskeletal:  Negative for joint pain, joint swelling, abnormal ROM of joint, back pain, muscle cramps and muscle ache.   Skin:  Negative for rash and hives. "   Allergic/Immunologic: Negative for seasonal allergies, food allergies, hives, itching and sneezing.   Neurological:  Negative for dizziness, light-headedness, passing out, loss of balance, headaches, altered mental status, loss of consciousness and seizures.   Hematologic/Lymphatic: Negative for swollen lymph nodes.   Psychiatric/Behavioral:  Negative for altered mental status and nervous/anxious. The patient is not nervous/anxious.       Objective:     Physical Exam   Constitutional: She is oriented to person, place, and time. She appears well-developed. She is cooperative.  Non-toxic appearance. She does not appear ill. No distress.   HENT:   Head: Normocephalic and atraumatic.   Ears:   Right Ear: Hearing, tympanic membrane, external ear and ear canal normal.   Left Ear: Hearing, tympanic membrane, external ear and ear canal normal.   Nose: Mucosal edema and rhinorrhea present. No nasal deformity. No epistaxis. Right sinus exhibits no maxillary sinus tenderness and no frontal sinus tenderness. Left sinus exhibits no maxillary sinus tenderness and no frontal sinus tenderness.   Mouth/Throat: Uvula is midline and mucous membranes are normal. No trismus in the jaw. Normal dentition. No uvula swelling. Posterior oropharyngeal erythema and cobblestoning present. No oropharyngeal exudate, posterior oropharyngeal edema or tonsillar abscesses. No tonsillar exudate.   Eyes: Conjunctivae and lids are normal. No scleral icterus.   Neck: Trachea normal and phonation normal. Neck supple. No edema present. No erythema present. No neck rigidity present.   Cardiovascular: Normal rate, regular rhythm, normal heart sounds and normal pulses.   Pulmonary/Chest: Effort normal and breath sounds normal. No accessory muscle usage or stridor. No respiratory distress. She has no decreased breath sounds. She has no wheezes. She has no rhonchi. She has no rales.   Abdominal: Normal appearance.   Musculoskeletal: Normal range of motion.          General: No deformity or edema. Normal range of motion.   Lymphadenopathy:     She has no cervical adenopathy.   Neurological: She is alert and oriented to person, place, and time. She exhibits normal muscle tone. Coordination normal.   Skin: Skin is warm, dry, intact, not diaphoretic, not pale and no rash. Capillary refill takes less than 2 seconds.   Psychiatric: Her speech is normal and behavior is normal. Judgment and thought content normal.   Nursing note and vitals reviewed.    Results for orders placed or performed in visit on 07/20/24   POCT Strep A, Molecular   Result Value Ref Range    Molecular Strep A, POC Negative Negative     Acceptable Yes        Assessment:     1. Upper respiratory tract infection, unspecified type    2. Sore throat        Plan:       Upper respiratory tract infection, unspecified type    Sore throat  -     POCT Strep A, Molecular      Patient Instructions   INSTRUCTIONS:  - Rest.  - Drink plenty of fluids.  - Take Tylenol and/or Ibuprofen as directed as needed for fever/pain.  Do not take more than the recommended dose.  - follow up with your PCP within the next 1-2 weeks as needed.  - You must understand that you have received an Urgent Care treatment only and that you may be released before all of your medical problems are known or treated.   - You, the patient, will arrange for follow up care as instructed.   - If your condition worsens or fails to improve we recommend that you receive another evaluation at the ER immediately or contact your PCP to discuss your concerns.   - You can call [phone] or [phone] to help schedule an appointment with the appropriate provider.     -If you smoke cigarettes, it would be beneficial for you to stop.